# Patient Record
Sex: FEMALE | Race: WHITE | NOT HISPANIC OR LATINO | Employment: UNEMPLOYED | URBAN - METROPOLITAN AREA
[De-identification: names, ages, dates, MRNs, and addresses within clinical notes are randomized per-mention and may not be internally consistent; named-entity substitution may affect disease eponyms.]

---

## 2017-05-15 PROBLEM — M65.30 TRIGGER FINGER: Status: ACTIVE | Noted: 2017-05-15

## 2019-05-20 ENCOUNTER — PATIENT OUTREACH (OUTPATIENT)
Dept: ADMINISTRATIVE | Facility: HOSPITAL | Age: 54
End: 2019-05-20

## 2019-08-05 ENCOUNTER — PATIENT OUTREACH (OUTPATIENT)
Dept: ADMINISTRATIVE | Facility: HOSPITAL | Age: 54
End: 2019-08-05

## 2020-06-15 ENCOUNTER — PATIENT OUTREACH (OUTPATIENT)
Dept: ADMINISTRATIVE | Facility: HOSPITAL | Age: 55
End: 2020-06-15

## 2020-06-15 DIAGNOSIS — Z12.31 ENCOUNTER FOR SCREENING MAMMOGRAM FOR BREAST CANCER: Primary | ICD-10-CM

## 2020-09-29 PROBLEM — M65.341 TRIGGER RING FINGER OF RIGHT HAND: Status: ACTIVE | Noted: 2020-09-29

## 2020-10-28 PROBLEM — Z12.11 ENCOUNTER FOR COLORECTAL CANCER SCREENING: Status: ACTIVE | Noted: 2020-10-28

## 2020-10-28 PROBLEM — Z12.12 ENCOUNTER FOR COLORECTAL CANCER SCREENING: Status: ACTIVE | Noted: 2020-10-28

## 2022-10-18 ENCOUNTER — PATIENT OUTREACH (OUTPATIENT)
Dept: ADMINISTRATIVE | Facility: HOSPITAL | Age: 57
End: 2022-10-18
Payer: MEDICAID

## 2022-12-28 ENCOUNTER — PATIENT OUTREACH (OUTPATIENT)
Dept: ADMINISTRATIVE | Facility: HOSPITAL | Age: 57
End: 2022-12-28
Payer: MEDICAID

## 2022-12-28 NOTE — PROGRESS NOTES
Chart reviewed, immunization record updated.  No new results noted on [facility] or [facility] web site.  Patient care coordination note  Upcoming PCP visit updated.  Next PCP visit 01/17/2023  LOV with PCP 07/14/2022  Rescheduled pap smear and mammogram

## 2023-07-25 ENCOUNTER — PATIENT OUTREACH (OUTPATIENT)
Dept: ADMINISTRATIVE | Facility: HOSPITAL | Age: 58
End: 2023-07-25
Payer: MEDICAID

## 2023-07-26 PROBLEM — R53.1 WEAKNESS: Status: ACTIVE | Noted: 2023-07-26

## 2023-07-26 PROBLEM — R68.89 ALTERATION IN ACTIVITY: Status: ACTIVE | Noted: 2023-07-26

## 2023-07-26 PROBLEM — M25.612 DECREASED RANGE OF MOTION OF LEFT SHOULDER: Status: ACTIVE | Noted: 2023-07-26

## 2023-07-26 PROBLEM — M25.512 LEFT SHOULDER PAIN: Status: ACTIVE | Noted: 2023-07-26

## 2023-07-26 PROBLEM — M79.622 PAIN IN LEFT UPPER ARM: Status: ACTIVE | Noted: 2023-07-26

## 2023-12-01 PROBLEM — S82.142A CLOSED FRACTURE OF LEFT TIBIAL PLATEAU: Status: ACTIVE | Noted: 2023-12-01

## 2023-12-03 PROBLEM — R07.89 OTHER CHEST PAIN: Status: ACTIVE | Noted: 2023-12-03

## 2024-03-06 ENCOUNTER — TELEPHONE (OUTPATIENT)
Dept: SURGERY | Facility: CLINIC | Age: 59
End: 2024-03-06
Payer: MEDICARE

## 2024-03-06 NOTE — TELEPHONE ENCOUNTER
Will send over to [place] I tried to schedule directly from referral for [facility] on Thur 14th or 21st saying I need an over ride ...

## 2024-03-14 ENCOUNTER — OFFICE VISIT (OUTPATIENT)
Dept: SURGERY | Facility: CLINIC | Age: 59
End: 2024-03-14
Payer: MEDICARE

## 2024-03-14 VITALS
DIASTOLIC BLOOD PRESSURE: 68 MMHG | HEART RATE: 69 BPM | BODY MASS INDEX: 32.67 KG/M2 | OXYGEN SATURATION: 99 % | HEIGHT: 58 IN | SYSTOLIC BLOOD PRESSURE: 118 MMHG | WEIGHT: 155.63 LBS | RESPIRATION RATE: 19 BRPM

## 2024-03-14 DIAGNOSIS — S82.142D CLOSED FRACTURE OF LEFT TIBIAL PLATEAU WITH ROUTINE HEALING, SUBSEQUENT ENCOUNTER: ICD-10-CM

## 2024-03-14 DIAGNOSIS — T81.31XA POSTOPERATIVE WOUND BREAKDOWN, INITIAL ENCOUNTER: Primary | ICD-10-CM

## 2024-03-14 PROCEDURE — 99214 OFFICE O/P EST MOD 30 MIN: CPT | Mod: PBBFAC | Performed by: SURGERY

## 2024-03-14 PROCEDURE — 99204 OFFICE O/P NEW MOD 45 MIN: CPT | Mod: S$GLB,,, | Performed by: SURGERY

## 2024-03-14 PROCEDURE — 99999 PR PBB SHADOW E&M-EST. PATIENT-LVL IV: CPT | Mod: PBBFAC,,, | Performed by: SURGERY

## 2024-03-15 ENCOUNTER — TELEPHONE (OUTPATIENT)
Dept: PLASTIC SURGERY | Facility: CLINIC | Age: 59
End: 2024-03-15
Payer: MEDICARE

## 2024-03-15 DIAGNOSIS — S82.142D CLOSED FRACTURE OF LEFT TIBIAL PLATEAU WITH ROUTINE HEALING, SUBSEQUENT ENCOUNTER: Primary | ICD-10-CM

## 2024-03-15 NOTE — TELEPHONE ENCOUNTER
Spoke to pt and confirmed  sx 3/21/24 at [facility] , 2nd floor - Pt and daughter  agreeable. Provided patient with details of post op appts, basic prep for sx, arrival time the day before, triage call from anesthesia dept, and address of the location.  call back # to RN navigator given should any questions or concerns arise  All questions and concerns addressed. Pt voiced understanding.

## 2024-03-17 PROBLEM — T81.31XA POSTOPERATIVE WOUND BREAKDOWN, INITIAL ENCOUNTER: Status: ACTIVE | Noted: 2024-03-17
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
## 2024-03-17 NOTE — ASSESSMENT & PLAN NOTE
She was a postoperative wound dehiscence from her tibial plateau fracture.  She was a deep wound.  There was a large amount of fibrinous necrotic material in the base.  I am almost certain that after a debridement plate with a tibial plateau we will be exposed..      I discussed the importance of thorough debridement with soft tissue coverage top of the most reliable coverage in his area being a pedicled gastroc muscle flap with a split-thickness skin graft.  I think a muscle flap is best for her with her active smoking.  I discussed that this is 1 of 3 muscles the help with plantar flexion of the foot and she may notice a little weakness but should not be debilitating.    Also stressed the importance of limiting smoking as much as possible in the negative impact wound healing.    Briefly discussed the risks of worsening infection, nonhealing wounds, and limb loss    Was able to call Dr. Coronado and discussed this case over the phone.  He was aware of the patient seeing her x-rays do the difficult location of the fracture and the time course, they preferred to perform an adequate debridement, leave this plate in place and try to lift up the flap and remove the plate later.      I will plan to admit this patient report debridement and pedicled muscle flap next week. .  I informed her family to expect to be admitted for 5-7 days and be discharged with IV antibiotics for home

## 2024-03-20 ENCOUNTER — TELEPHONE (OUTPATIENT)
Dept: PLASTIC SURGERY | Facility: CLINIC | Age: 59
End: 2024-03-20
Payer: MEDICARE

## 2024-03-20 NOTE — TELEPHONE ENCOUNTER
----- Message from Rena Gonzales sent at 3/20/2024  1:37 PM CDT -----  Regarding: pt advice  Contact: guerrero Lilly @[phone]  Caller stated pt received a call in regards to the cost of surgery. Caller would like to speak to someone to clarify. Informed pt no surgery is scheduled. Pls call to discuss.

## 2024-03-20 NOTE — TELEPHONE ENCOUNTER
Spoke w pt's mom about sx cancelled dt out of network benefits - daughter wanted clarification as she did not think her mom understood- advised to [employer] for further explanation and to look into [facility] to see if accepts insurance - # given to our  if has further questions.

## 2024-10-11 ENCOUNTER — PATIENT MESSAGE (OUTPATIENT)
Dept: ADMINISTRATIVE | Facility: HOSPITAL | Age: 59
End: 2024-10-11
Payer: MEDICARE